# Patient Record
Sex: FEMALE | Race: WHITE | NOT HISPANIC OR LATINO | Employment: FULL TIME | URBAN - METROPOLITAN AREA
[De-identification: names, ages, dates, MRNs, and addresses within clinical notes are randomized per-mention and may not be internally consistent; named-entity substitution may affect disease eponyms.]

---

## 2024-09-26 ENCOUNTER — OFFICE VISIT (OUTPATIENT)
Dept: URGENT CARE | Age: 29
End: 2024-09-26
Payer: COMMERCIAL

## 2024-09-26 VITALS
HEART RATE: 90 BPM | OXYGEN SATURATION: 99 % | SYSTOLIC BLOOD PRESSURE: 102 MMHG | TEMPERATURE: 98.3 F | DIASTOLIC BLOOD PRESSURE: 69 MMHG | RESPIRATION RATE: 16 BRPM

## 2024-09-26 DIAGNOSIS — N76.0 BACTERIAL VAGINOSIS: Primary | ICD-10-CM

## 2024-09-26 DIAGNOSIS — N89.8 VAGINAL DISCHARGE: ICD-10-CM

## 2024-09-26 DIAGNOSIS — B96.89 BACTERIAL VAGINOSIS: Primary | ICD-10-CM

## 2024-09-26 LAB
POC APPEARANCE, URINE: CLEAR
POC BACTERIAL VAGINITIS (RAPID): POSITIVE
POC BILIRUBIN, URINE: NEGATIVE
POC BLOOD, URINE: ABNORMAL
POC COLOR, URINE: YELLOW
POC GLUCOSE, URINE: NEGATIVE MG/DL
POC KETONES, URINE: NEGATIVE MG/DL
POC LEUKOCYTES, URINE: ABNORMAL
POC NITRITE,URINE: NEGATIVE
POC PH, URINE: 6 PH
POC PROTEIN, URINE: NEGATIVE MG/DL
POC SPECIFIC GRAVITY, URINE: 1.02
POC UROBILINOGEN, URINE: 0.2 EU/DL
PREGNANCY TEST URINE, POC: NEGATIVE

## 2024-09-26 PROCEDURE — 87102 FUNGUS ISOLATION CULTURE: CPT

## 2024-09-26 PROCEDURE — 87591 N.GONORRHOEAE DNA AMP PROB: CPT

## 2024-09-26 PROCEDURE — 87491 CHLMYD TRACH DNA AMP PROBE: CPT

## 2024-09-26 PROCEDURE — 87661 TRICHOMONAS VAGINALIS AMPLIF: CPT

## 2024-09-26 RX ORDER — SPIRONOLACTONE 100 MG/1
100 TABLET, FILM COATED ORAL
COMMUNITY
Start: 2024-09-04

## 2024-09-26 RX ORDER — METRONIDAZOLE 500 MG/1
500 TABLET ORAL 2 TIMES DAILY
Qty: 14 TABLET | Refills: 0 | Status: SHIPPED | OUTPATIENT
Start: 2024-09-26 | End: 2024-10-03

## 2024-09-26 ASSESSMENT — ENCOUNTER SYMPTOMS
COUGH: 0
JOINT SWELLING: 0
DIFFICULTY URINATING: 0
FATIGUE: 0
ARTHRALGIAS: 0
NAUSEA: 0
DIARRHEA: 0
FREQUENCY: 0
HEMATURIA: 0
FLANK PAIN: 0
CHILLS: 0
ABDOMINAL PAIN: 0
CHEST TIGHTNESS: 0
VOMITING: 0
FEVER: 0
SHORTNESS OF BREATH: 0

## 2024-09-26 NOTE — PROGRESS NOTES
Subjective   Patient ID: Courtney Becker is a 29 y.o. female. They present today with a chief complaint of Vaginal Discharge (Vaginal discharge x 5 days.).    History of Present Illness  Pt presented with 5days abnormal vaginal discharge with itchiness. States noticed symptoms 2wks ago and she took Diflucan  (left over from last visit) and it seemed to help her relieve the symptoms but then worse again. New sexual partner with possible exposure. Denies UTI symptom, pelvic pain, fever, N/V.           Past Medical History  Allergies as of 09/26/2024    (No Known Allergies)       (Not in a hospital admission)       History reviewed. No pertinent past medical history.    History reviewed. No pertinent surgical history.     reports that she has never smoked. She has never used smokeless tobacco.    Review of Systems  Review of Systems   Constitutional:  Negative for chills, fatigue and fever.   Respiratory:  Negative for cough, chest tightness and shortness of breath.    Cardiovascular:  Negative for chest pain.   Gastrointestinal:  Negative for abdominal pain, diarrhea, nausea and vomiting.   Genitourinary:  Positive for vaginal discharge. Negative for difficulty urinating, dyspareunia, flank pain, frequency, hematuria, pelvic pain, urgency, vaginal bleeding and vaginal pain.   Musculoskeletal:  Negative for arthralgias and joint swelling.   Skin:  Negative for rash.                                  Objective    Vitals:    09/26/24 0941   BP: 102/69   Pulse: 90   Resp: 16   Temp: 36.8 °C (98.3 °F)   SpO2: 99%     Patient's last menstrual period was 09/02/2024 (exact date).    Physical Exam  Constitutional:       Appearance: Normal appearance.   HENT:      Head: Normocephalic and atraumatic.      Right Ear: Tympanic membrane, ear canal and external ear normal.      Left Ear: Tympanic membrane, ear canal and external ear normal.      Nose: Nose normal.   Cardiovascular:      Rate and Rhythm: Normal rate and regular rhythm.       Heart sounds: No murmur heard.  Pulmonary:      Effort: Pulmonary effort is normal. No respiratory distress.      Breath sounds: No stridor. No wheezing, rhonchi or rales.   Abdominal:      General: There is no distension.      Tenderness: There is no abdominal tenderness. There is no guarding or rebound.   Genitourinary:     Vagina: Vaginal discharge present.   Musculoskeletal:         General: No swelling or tenderness. Normal range of motion.   Neurological:      Mental Status: She is alert and oriented to person, place, and time.   Psychiatric:         Mood and Affect: Mood normal.         Procedures    Point of Care Test & Imaging Results from this visit  Results for orders placed or performed in visit on 09/26/24   POCT BV Blue Rapid - Bacterial Vaginitis manually resulted   Result Value Ref Range    POC Bacterial Vaginitis (Rapid) Positive (A) Negative   POCT UA Automated manually resulted   Result Value Ref Range    POC Color, Urine Yellow Straw, Yellow, Light-Yellow    POC Appearance, Urine Clear Clear    POC Glucose, Urine NEGATIVE NEGATIVE mg/dl    POC Bilirubin, Urine NEGATIVE NEGATIVE    POC Ketones, Urine NEGATIVE NEGATIVE mg/dl    POC Specific Gravity, Urine 1.025 1.005 - 1.035    POC Blood, Urine SMALL (1+) (A) NEGATIVE    POC PH, Urine 6.0 No Reference Range Established PH    POC Protein, Urine NEGATIVE NEGATIVE, 30 (1+) mg/dl    POC Urobilinogen, Urine 0.2 0.2, 1.0 EU/DL    Poc Nitrite, Urine NEGATIVE NEGATIVE    POC Leukocytes, Urine TRACE (A) NEGATIVE   POCT pregnancy, urine manually resulted   Result Value Ref Range    Preg Test, Ur Negative Negative      No results found.    Diagnostic study results (if any) were reviewed by Ronel Ambrose PA-C.    Assessment/Plan   Allergies, medications, history, and pertinent labs/EKGs/Imaging reviewed by Ronel Ambrose PA-C.     Medical Decision Making  Rapid BV positive and will start treatment  Yeast, STD screening pending  Denies UTI symptoms and urine dip  no strong evidence for UTI  Low suspicion for medical emergency conditions     Orders and Diagnoses  Diagnoses and all orders for this visit:  Bacterial vaginosis  -     metroNIDAZOLE (Flagyl) 500 mg tablet; Take 1 tablet (500 mg) by mouth 2 times a day for 7 days.  Vaginal discharge  -     POCT BV Blue Rapid - Bacterial Vaginitis manually resulted  -     POCT UA Automated manually resulted  -     POCT pregnancy, urine manually resulted  -     C. Trachomatis / N. Gonorrhoeae, Amplified Detection  -     Trichomonas vaginalis, Amplified  -     Fungal Screen, Yeast      Medical Admin Record      Patient disposition: Home    Electronically signed by Ronel Ambrose PA-C  10:16 AM

## 2024-09-26 NOTE — PATIENT INSTRUCTIONS
Take medications as instructed  Will call for positive test result  F/U with PCP for lingering symptoms  Wear condoms to prevent STD

## 2024-09-28 LAB
C TRACH RRNA SPEC QL NAA+PROBE: NEGATIVE
N GONORRHOEA DNA SPEC QL PROBE+SIG AMP: NEGATIVE
T VAGINALIS RRNA SPEC QL NAA+PROBE: NEGATIVE

## 2024-09-29 LAB — YEAST SPEC QL CULT: NORMAL

## 2024-10-01 LAB — YEAST SPEC QL CULT: NORMAL
